# Patient Record
Sex: MALE | Race: WHITE | URBAN - METROPOLITAN AREA
[De-identification: names, ages, dates, MRNs, and addresses within clinical notes are randomized per-mention and may not be internally consistent; named-entity substitution may affect disease eponyms.]

---

## 2024-11-05 ENCOUNTER — OFFICE VISIT (OUTPATIENT)
Dept: ORTHOPEDIC SURGERY | Age: 44
End: 2024-11-05
Payer: COMMERCIAL

## 2024-11-05 VITALS — BODY MASS INDEX: 32.58 KG/M2 | HEIGHT: 69 IN | WEIGHT: 220 LBS

## 2024-11-05 DIAGNOSIS — M25.521 RIGHT ELBOW PAIN: Primary | ICD-10-CM

## 2024-11-05 DIAGNOSIS — M77.11 LATERAL EPICONDYLITIS OF RIGHT ELBOW: ICD-10-CM

## 2024-11-05 PROCEDURE — 99204 OFFICE O/P NEW MOD 45 MIN: CPT | Performed by: STUDENT IN AN ORGANIZED HEALTH CARE EDUCATION/TRAINING PROGRAM

## 2024-11-05 NOTE — PROGRESS NOTES
Date:  2024    Name:  Kilo Oleary  Address:  [address] [address]    :  1980      Age:   44 y.o.    SSN:  [id number]      Medical Record Number:  [id number]    Reason for Visit:    Chief Complaint    Elbow Injury (CK RIGHT ELBOW)      DOS:2024     HPI: Kilo Oleary is a 44 y.o. male here today for new patient evaluation regarding his right elbow.  The patient is left-hand dominant.  The patient reports a several months of worsening right elbow pain.  The patient denies an injury.  The patient does work out quite a bit.  He reports most of his pain is over the lateral aspect of his elbow.  He denies numbness and tingling.  Sometimes the pain goes down into his forearm.  He denies clicking or popping      Pain Assessment  Location of Pain: Elbow  Location Modifiers: Right  Severity of Pain: 6  Quality of Pain: Sharp, Throbbing  Duration of Pain: Persistent  Frequency of Pain: Constant  Aggravating Factors: Bending, Stretching, Straightening  Limiting Behavior: Some  Relieving Factors: Rest  Result of Injury: No  Work-Related Injury: No  Are there other pain locations you wish to document?: No  ROS: All systems reviewed on patient intake form.  Pertinent items are noted in HPI.        No past medical history on file.     No past surgical history on file.    No family history on file.    Social History     Socioeconomic History    Marital status:      Social Determinants of Health      Received from [facility]    Food Insecurities    Received from [facility]    Transportation    Received from [facility]    Interpersonal Safety    Received from [facility]    Housing/Utilities       Current Outpatient Medications   Medication Sig Dispense Refill    diclofenac (VOLTAREN) 50 MG EC tablet Take 1 tablet by mouth 2 times daily 60 tablet 0     No current